# Patient Record
Sex: FEMALE | Race: WHITE | NOT HISPANIC OR LATINO | URBAN - METROPOLITAN AREA
[De-identification: names, ages, dates, MRNs, and addresses within clinical notes are randomized per-mention and may not be internally consistent; named-entity substitution may affect disease eponyms.]

---

## 2023-11-13 ENCOUNTER — OFFICE VISIT (OUTPATIENT)
Dept: URGENT CARE | Facility: CLINIC | Age: 33
End: 2023-11-13
Payer: COMMERCIAL

## 2023-11-13 VITALS
HEART RATE: 81 BPM | TEMPERATURE: 99 F | DIASTOLIC BLOOD PRESSURE: 79 MMHG | HEIGHT: 63 IN | SYSTOLIC BLOOD PRESSURE: 116 MMHG | RESPIRATION RATE: 18 BRPM | BODY MASS INDEX: 21.26 KG/M2 | OXYGEN SATURATION: 99 % | WEIGHT: 120 LBS

## 2023-11-13 DIAGNOSIS — T14.8XXA BLOOD BLISTER: Primary | ICD-10-CM

## 2023-11-13 DIAGNOSIS — L03.115 CELLULITIS OF RIGHT LOWER EXTREMITY: ICD-10-CM

## 2023-11-13 PROCEDURE — 99213 PR OFFICE/OUTPT VISIT, EST, LEVL III, 20-29 MIN: ICD-10-PCS | Mod: S$GLB,,, | Performed by: FAMILY MEDICINE

## 2023-11-13 PROCEDURE — 99213 OFFICE O/P EST LOW 20 MIN: CPT | Mod: S$GLB,,, | Performed by: FAMILY MEDICINE

## 2023-11-13 RX ORDER — NORGESTIMATE AND ETHINYL ESTRADIOL 7DAYSX3 28
1 KIT ORAL
COMMUNITY
Start: 2023-06-05

## 2023-11-13 RX ORDER — DIPHENHYDRAMINE HYDROCHLORIDE 25 MG/1
25 CAPSULE ORAL
COMMUNITY
Start: 2023-07-11

## 2023-11-13 RX ORDER — MUPIROCIN 20 MG/G
OINTMENT TOPICAL
Qty: 22 G | Refills: 1 | Status: SHIPPED | OUTPATIENT
Start: 2023-11-13

## 2023-11-13 NOTE — PROGRESS NOTES
"Subjective:      Patient ID: Drea Meraz is a 33 y.o. female.    Vitals:  height is 5' 3" (1.6 m) and weight is 54.4 kg (120 lb). Her temperature is 98.9 °F (37.2 °C). Her blood pressure is 116/79 and her pulse is 81. Her respiration is 18 and oxygen saturation is 99%.     Chief Complaint: Foot Swelling    Pt presents complaints of foot pain. Right foot/side of foot.   Pt had a blister on Friday and over night it started to swell. This morning it started to drain puss and blood. Pain level 4. OTC none     Foot Injury   There was no injury mechanism. The pain is present in the right foot. The quality of the pain is described as aching. The pain is at a severity of 4/10. The pain is mild. The pain has been Constant since onset. Associated symptoms include an inability to bear weight. Pertinent negatives include no loss of motion, loss of sensation, muscle weakness, numbness or tingling. She reports no foreign bodies present. The symptoms are aggravated by weight bearing. She has tried nothing for the symptoms.       Neurological:  Negative for numbness.      Objective:     Physical Exam   Constitutional: She is oriented to person, place, and time.   Abdominal: Normal appearance.   Musculoskeletal:         General: Swelling and tenderness present.      Comments: Rt foot at great toe laterally is an open wound from a blood blister that is oozing slightly and with some surrounding erythema and swelling, no pus noted   Neurological: no focal deficit. She is alert and oriented to person, place, and time.   Skin: Skin is warm and dry. lesion (as above)   Psychiatric: Her behavior is normal. Judgment and thought content normal.   Nursing note and vitals reviewed.      Assessment:     1. Blood blister    2. Cellulitis of right lower extremity        Plan:       Blood blister    Cellulitis of right lower extremity  -     mupirocin (BACTROBAN) 2 % ointment; Apply to affected area 3 times daily  Dispense: 22 g; " Refill: 1    Wound cleaned and dressed with bactroban and non stick bandage. Continue antibiotic ointment 3 times a day until healed . Avoid tight fitting shoes.   Pt or guardian provided educational materials and instructions regarding their visit diagnosis.